# Patient Record
Sex: FEMALE | NOT HISPANIC OR LATINO | URBAN - METROPOLITAN AREA
[De-identification: names, ages, dates, MRNs, and addresses within clinical notes are randomized per-mention and may not be internally consistent; named-entity substitution may affect disease eponyms.]

---

## 2017-01-27 ENCOUNTER — IMPORTED ENCOUNTER (OUTPATIENT)
Dept: URBAN - METROPOLITAN AREA CLINIC 1 | Facility: CLINIC | Age: 67
End: 2017-01-27

## 2018-02-23 NOTE — PATIENT DISCUSSION
On POD #1, the patient is doing well. Post-op cataract sheet was given and reviewed. Instructed to continue antibiotic drops for 7 days, steroid drops 2 times a day for 2 weeks, then 1 times a day for 2 weeks and NSAID drops for 4 weeks.  No swimming for 2 weeks. Contact immediately for decreased vision, red eye or pain. Advised against heavy lifting, prolonged bending, or straining. No make up/showers/water to the eye. Emergency phone numbers given for patient to call at any time.

## 2018-03-02 NOTE — PATIENT DISCUSSION
Patient advised of right to post-surgical care by surgeon. Patient fully informed of, and agreed to, co-management. Post-surgical care by surgeon not medically necessary. Co-management is clinically appropriate. Patient received itemization of services and fees.

## 2018-08-27 ENCOUNTER — IMPORTED ENCOUNTER (OUTPATIENT)
Dept: URBAN - METROPOLITAN AREA CLINIC 1 | Facility: CLINIC | Age: 68
End: 2018-08-27

## 2018-08-27 PROBLEM — H40.013: Noted: 2018-08-27

## 2018-08-27 PROBLEM — H04.123: Noted: 2018-08-27

## 2018-08-27 PROBLEM — H34.831: Noted: 2018-08-27

## 2018-08-27 PROBLEM — H16.143: Noted: 2018-08-27

## 2018-08-27 PROBLEM — H34.231: Noted: 2018-08-27

## 2018-08-27 PROBLEM — Z96.1: Noted: 2018-08-27

## 2018-08-27 PROBLEM — H35.3131: Noted: 2018-08-27

## 2018-08-27 PROCEDURE — 92015 DETERMINE REFRACTIVE STATE: CPT

## 2018-08-27 PROCEDURE — 92014 COMPRE OPH EXAM EST PT 1/>: CPT

## 2018-08-27 NOTE — PATIENT DISCUSSION
ARMD OU Mild/dry/stable. Importance of daily AREDS II study multivitamin and Amsler Grid checks discussed with patient. Patient to follow-up immediately with any new onset of decreased vision and/or metamorphopsia.

## 2018-08-27 NOTE — PATIENT DISCUSSION
1.  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 (smoker) Referring patient to Retinal Specialist for consult. (Andrew/Steffanie)2. ARMD OU Mild/dry/stable. Importance of daily AREDS II study multivitamin and Amsler Grid checks discussed with patient. Patient to follow-up immediately with any new onset of decreased vision and/or metamorphopsia. 3. Glaucoma Suspect OU : (.8 OU) past w/u negative. Patient is considered Low Risk. Condition was discussed with patient and patient understands. Will continue to monitor patient for any progression in condition. Patient was advised to call us with any problems questions or concerns. 4.  JACE w/ PEK OU-The continuation of artificial tears were recommended. 5.  Pseudophakia OU - standard6. Return for an appointment for 6mo/DFE/ OCT with Dr. Arvind yLnn.

## 2018-08-27 NOTE — PATIENT DISCUSSION
Glaucoma Suspect OU : (.8 OU) past w/u negative. Patient is considered Low Risk. Condition was discussed with patient and patient understands. Will continue to monitor patient for any progression in condition. Patient was advised to call us with any problems questions or concerns.

## 2019-04-12 ENCOUNTER — IMPORTED ENCOUNTER (OUTPATIENT)
Dept: URBAN - METROPOLITAN AREA CLINIC 1 | Facility: CLINIC | Age: 69
End: 2019-04-12

## 2019-04-12 PROBLEM — H40.013: Noted: 2019-04-12

## 2019-04-12 PROCEDURE — 92133 CPTRZD OPH DX IMG PST SGM ON: CPT

## 2019-04-12 PROCEDURE — 92012 INTRM OPH EXAM EST PATIENT: CPT

## 2019-04-12 NOTE — PATIENT DISCUSSION
1. COAG Suspect OU (CD 0.8 OU) OCT WNL OU today. IOP stable on no gtts. Cont to observe on no gtts. 2.  BRVO/BRAO OD -  H/o injections with Retina. (Smoker) F/u as scheduled with Dr. Ginny Neves. 3.  ARMD OU Mild/dry/stable. Importance of daily AREDS II study multivitamin and Amsler Grid checks discussed with patient. Patient to follow-up immediately with any new onset of decreased vision and/or metamorphopsia. 4. JACE w/ PEK OU- Continue ATs TID OU routinely. 5.  Pseudophakia OU - (Standard OU)     Return for an appointment in 1 yr 27 with Dr. Alfred Barraza.

## 2019-09-11 ENCOUNTER — IMPORTED ENCOUNTER (OUTPATIENT)
Dept: URBAN - METROPOLITAN AREA CLINIC 1 | Facility: CLINIC | Age: 69
End: 2019-09-11

## 2019-09-11 PROBLEM — H16.143: Noted: 2019-09-11

## 2019-09-11 PROBLEM — Z96.1: Noted: 2019-09-11

## 2019-09-11 PROBLEM — H11.152: Noted: 2019-09-11

## 2019-09-11 PROBLEM — H04.123: Noted: 2019-09-11

## 2019-09-11 PROBLEM — H11.32: Noted: 2019-09-11

## 2019-09-11 PROCEDURE — 92012 INTRM OPH EXAM EST PATIENT: CPT

## 2019-09-11 NOTE — PATIENT DISCUSSION
1.  Subconjunctival hemorrhage OS -- Reassurance given. Condition discussed with patient. 2.  JACE w/ PEK OU -- Continue ATs TID OU routinely. 3.  Pinguecula OS -- Use of sunglasses when exposed to UV light and observation is recommended. 4. Pseudophakia OU -- (Standard OU)5. COAG Suspect OU (CD 0.8 OU) Cont to observe on no gtts. 6.  H/o BRVO/BRAO OD -  H/o injections with Retina. (Smoker) F/u as scheduled with Dr. Hillary Hollins. 7.  H/o ARMD OU Mild/dry/stable. Return as scheduled with Dr. Humberto Bennett.

## 2021-02-03 ENCOUNTER — IMPORTED ENCOUNTER (OUTPATIENT)
Dept: URBAN - METROPOLITAN AREA CLINIC 1 | Facility: CLINIC | Age: 71
End: 2021-02-03

## 2021-02-03 PROBLEM — H35.3111: Noted: 2021-02-03

## 2021-02-03 PROBLEM — Z96.1: Noted: 2021-02-03

## 2021-02-03 PROBLEM — H34.8312: Noted: 2021-02-03

## 2021-02-03 PROBLEM — H34.231: Noted: 2021-02-03

## 2021-02-03 PROBLEM — H26.493: Noted: 2021-02-03

## 2021-02-03 PROBLEM — H43.393: Noted: 2021-02-03

## 2021-02-03 PROCEDURE — 92014 COMPRE OPH EXAM EST PT 1/>: CPT

## 2021-02-03 PROCEDURE — 92015 DETERMINE REFRACTIVE STATE: CPT

## 2021-02-03 NOTE — PATIENT DISCUSSION
1.  BRVO/BRAO OD -  H/o injections with Retina. (Smoker) F/u as scheduled with Dr. Susie Christopher. 2.  ARMD OD Early/dry/stable. Importance of daily AREDS II study multivitamin and Amsler Grid checks discussed with patient. Patient to follow-up immediately with any new onset of decreased vision and/or metamorphopsia. 3. PCO OU: (Posterior Capsule Opacification)   Observe and consider yag cap when pt feels pco visually significant and visual acuity decreases to appropriate level. 4. Pseudophakia OU - (Standard OU)   5. Vitreous Floaters OU - RD precautions. Observe. 6.  H/o Subconjunctival hemorrhage OS - resolved. MRX for glasses given. Return for an appointment in 1 year 27 with Dr. Brittney Taylor.

## 2021-02-03 NOTE — PATIENT DISCUSSION
1.  BRVO/BRAO OD -  H/o injections with Retina. (Smoker) F/u as scheduled with Dr. Coco Colby. 2.  ARMD OU Mild/dry/stable. Importance of daily AREDS II study multivitamin and Amsler Grid checks discussed with patient. Patient to follow-up immediately with any new onset of decreased vision and/or metamorphopsia. 3. JACE w/ PEK OU- Continue ATs TID OU routinely. 4.  Pseudophakia OU - (Standard OU)   5.   H/o Subconjunctival hemorrhage OS - resolved

## 2022-03-12 ASSESSMENT — TONOMETRY
OS_IOP_MMHG: 16
OD_IOP_MMHG: 15
OS_IOP_MMHG: 15
OD_IOP_MMHG: 16
OS_IOP_MMHG: 15
OS_IOP_MMHG: 15
OD_IOP_MMHG: 15
OD_IOP_MMHG: 15

## 2022-03-12 ASSESSMENT — VISUAL ACUITY
OD_SC: 20/20
OS_SC: 20/25+2
OS_CC: 20/20-1
OD_SC: 20/20
OD_SC: 20/25
OD_CC: 20/30-2
OS_SC: 20/20
OD_SC: 20/20
OS_SC: 20/25
OD_CC: J1
OS_CC: J1
OS_SC: 20/20

## 2022-07-05 ENCOUNTER — ESTABLISHED PATIENT (OUTPATIENT)
Dept: URBAN - METROPOLITAN AREA CLINIC 1 | Facility: CLINIC | Age: 72
End: 2022-07-05

## 2022-07-05 ASSESSMENT — TONOMETRY
OD_IOP_MMHG: 13
OS_IOP_MMHG: 13

## 2022-07-05 ASSESSMENT — VISUAL ACUITY
OD_CC: J1
OD_CC: 20/20
OS_CC: J1
OD_SC: 20/40
OS_CC: 20/20
OS_SC: 20/30